# Patient Record
Sex: MALE | Race: WHITE | URBAN - METROPOLITAN AREA
[De-identification: names, ages, dates, MRNs, and addresses within clinical notes are randomized per-mention and may not be internally consistent; named-entity substitution may affect disease eponyms.]

---

## 2023-08-03 ENCOUNTER — OFFICE (OUTPATIENT)
Dept: URBAN - METROPOLITAN AREA CLINIC 113 | Facility: CLINIC | Age: 78
Setting detail: OPHTHALMOLOGY
End: 2023-08-03
Payer: MEDICARE

## 2023-08-03 DIAGNOSIS — H26.493: ICD-10-CM

## 2023-08-03 DIAGNOSIS — Z96.1: ICD-10-CM

## 2023-08-03 DIAGNOSIS — H16.223: ICD-10-CM

## 2023-08-03 DIAGNOSIS — H43.393: ICD-10-CM

## 2023-08-03 PROCEDURE — 92014 COMPRE OPH EXAM EST PT 1/>: CPT | Performed by: OPHTHALMOLOGY

## 2023-08-03 PROCEDURE — 92250 FUNDUS PHOTOGRAPHY W/I&R: CPT | Performed by: OPHTHALMOLOGY

## 2023-08-03 ASSESSMENT — REFRACTION_AUTOREFRACTION
OD_AXIS: 085
OD_SPHERE: -0.50
OS_SPHERE: -1.00
OD_CYLINDER: -0.75
OS_AXIS: 133
OS_CYLINDER: -0.75

## 2023-08-03 ASSESSMENT — REFRACTION_MANIFEST
OD_ADD: +2.50
OS_SPHERE: -0.75
OD_CYLINDER: SPH
OS_VA2: J1
OS_CYLINDER: -0.50
OD_AXIS: 101
OD_VA1: 20/20
OS_VA1: 20/20
OS_AXIS: 129
OD_CYLINDER: -1.50
OD_VA2: J1
OS_AXIS: 130
OD_VA1: 20/30
OS_VA1: 20/20
OS_CYLINDER: -1.25
OD_SPHERE: +1.00
OD_SPHERE: -0.75
OS_SPHERE: -0.50
OU_VA: 20/20
OS_ADD: +2.50

## 2023-08-03 ASSESSMENT — SPHEQUIV_DERIVED
OS_SPHEQUIV: -0.75
OD_SPHEQUIV: -0.875
OD_SPHEQUIV: 0.25
OS_SPHEQUIV: -1.375
OS_SPHEQUIV: -1.375

## 2023-08-03 ASSESSMENT — REFRACTION_CURRENTRX
OD_OVR_VA: 20/
OD_SPHERE: PLANO
OS_CYLINDER: -0.75
OD_VPRISM_DIRECTION: PROGS
OS_OVR_VA: 20/
OS_VPRISM_DIRECTION: SV
OD_SPHERE: -0.75
OD_AXIS: 089
OS_CYLINDER: -1.25
OS_SPHERE: -0.25
OS_VPRISM_DIRECTION: PROGS
OD_OVR_VA: 20/
OS_ADD: +0.75
OS_OVR_VA: 20/
OS_SPHERE: +0.50
OD_CYLINDER: -1.00
OD_VPRISM_DIRECTION: SV
OD_CYLINDER: SPHERE
OS_AXIS: 124
OS_AXIS: 098
OD_ADD: +0.75

## 2023-08-03 ASSESSMENT — AXIALLENGTH_DERIVED
OS_AL: 23.6086
OD_AL: 23.7248
OS_AL: 23.855
OD_AL: 24.1765
OS_AL: 23.855

## 2023-08-03 ASSESSMENT — SUPERFICIAL PUNCTATE KERATITIS (SPK)
OD_SPK: 1+
OS_SPK: 1+

## 2023-08-03 ASSESSMENT — CONFRONTATIONAL VISUAL FIELD TEST (CVF)
OD_FINDINGS: FULL
OS_FINDINGS: FULL

## 2023-08-03 ASSESSMENT — KERATOMETRY
OD_AXISANGLE_DEGREES: 155
OD_K1POWER_DIOPTERS: 42.75
OS_AXISANGLE_DEGREES: 062
OS_K2POWER_DIOPTERS: 44.50
OS_K1POWER_DIOPTERS: 44.00
OD_K2POWER_DIOPTERS: 43.00

## 2023-08-03 ASSESSMENT — VISUAL ACUITY
OS_BCVA: 20/20+1
OD_BCVA: 20/20

## 2023-08-03 ASSESSMENT — TONOMETRY
OD_IOP_MMHG: 16
OS_IOP_MMHG: 18

## 2024-02-22 PROBLEM — Z00.00 ENCOUNTER FOR PREVENTIVE HEALTH EXAMINATION: Status: ACTIVE | Noted: 2024-02-22

## 2024-04-09 ENCOUNTER — NON-APPOINTMENT (OUTPATIENT)
Age: 79
End: 2024-04-09

## 2024-04-09 DIAGNOSIS — M77.52 OTHER ENTHESOPATHY OF LT FOOT AND ANKLE: ICD-10-CM

## 2024-04-09 DIAGNOSIS — L85.3 XEROSIS CUTIS: ICD-10-CM

## 2024-04-09 DIAGNOSIS — M20.41 OTHER HAMMER TOE(S) (ACQUIRED), RIGHT FOOT: ICD-10-CM

## 2024-04-09 DIAGNOSIS — B35.1 TINEA UNGUIUM: ICD-10-CM

## 2024-04-09 DIAGNOSIS — R23.4 CHANGES IN SKIN TEXTURE: ICD-10-CM

## 2024-04-09 DIAGNOSIS — G60.9 HEREDITARY AND IDIOPATHIC NEUROPATHY, UNSPECIFIED: ICD-10-CM

## 2024-04-09 RX ORDER — ATORVASTATIN CALCIUM 20 MG/1
20 TABLET, FILM COATED ORAL
Refills: 0 | Status: ACTIVE | COMMUNITY

## 2024-04-09 RX ORDER — MELOXICAM 15 MG/1
15 TABLET ORAL
Refills: 0 | Status: ACTIVE | COMMUNITY
Start: 1900-01-01 | End: 1900-01-01

## 2024-04-19 ENCOUNTER — APPOINTMENT (OUTPATIENT)
Dept: PODIATRY | Facility: CLINIC | Age: 79
End: 2024-04-19
Payer: MEDICARE

## 2024-04-19 VITALS — WEIGHT: 195 LBS | HEIGHT: 75 IN | BODY MASS INDEX: 24.25 KG/M2

## 2024-04-19 DIAGNOSIS — M77.32 CALCANEAL SPUR, LEFT FOOT: ICD-10-CM

## 2024-04-19 DIAGNOSIS — M72.2 PLANTAR FASCIAL FIBROMATOSIS: ICD-10-CM

## 2024-04-19 PROCEDURE — 99214 OFFICE O/P EST MOD 30 MIN: CPT

## 2024-04-22 PROBLEM — M72.2 PLANTAR FASCIITIS, LEFT: Status: ACTIVE | Noted: 2024-04-09

## 2024-04-22 PROBLEM — M77.32 CALCANEAL SPUR OF LEFT FOOT: Status: ACTIVE | Noted: 2024-04-09

## 2024-04-22 NOTE — HISTORY OF PRESENT ILLNESS
[FreeTextEntry1] : Truong Buckner returns stating he is doing a lot better.  He states that his left heel feels a lot better.  He states that he has been taking the medicine and doing the stretching.  He also stated that it felt better wrapped up.  He states that he is very grateful because the pain is almost gone.

## 2024-06-13 ENCOUNTER — APPOINTMENT (OUTPATIENT)
Dept: PODIATRY | Facility: CLINIC | Age: 79
End: 2024-06-13
Payer: MEDICARE

## 2024-06-13 DIAGNOSIS — M79.675 PAIN IN LEFT TOE(S): ICD-10-CM

## 2024-06-13 DIAGNOSIS — B35.1 TINEA UNGUIUM: ICD-10-CM

## 2024-06-13 DIAGNOSIS — M79.674 PAIN IN RIGHT TOE(S): ICD-10-CM

## 2024-06-13 PROCEDURE — 11721 DEBRIDE NAIL 6 OR MORE: CPT

## 2024-06-17 PROBLEM — M79.675 PAIN OF TOE OF LEFT FOOT: Status: ACTIVE | Noted: 2024-04-09

## 2024-06-17 PROBLEM — M79.674 PAIN OF TOE OF RIGHT FOOT: Status: ACTIVE | Noted: 2024-04-09

## 2024-06-17 PROBLEM — B35.1 ONYCHOMYCOSIS DUE TO TRICHOPHYTON RUBRUM: Status: ACTIVE | Noted: 2024-04-09

## 2024-06-17 NOTE — ASSESSMENT
[FreeTextEntry1] : Assessment: Onychomycosis caused by T. Rubrum.  Plan: I then performed aseptic debridement of all the toenails both mechanically and via electrical burring.  All toenails were trimmed with a 14 cm sterile stainless steel box lock double spring nail splitter.  Then utilizing a sterile pear shaped kwaku sammi (this device falls under bur, surgical, general & plastic surgery.  The FDA deems this item a Class-1 device) via a 35,000 RPM electric drill and vacuum and dust extractor system all toenails were aseptically debrided removing fungal layers.  This is done to diminish the fungal load of the toenails and enhance the effects of the antifungal medication, allowing overall improvement in the degree of fungal infection as well as improve appearance and reduce discomfort and help diminish chances of secondary bacterial infection, also lessening the chance of ingrown nails, especially when performed on a regular basis.  The patient was encouraged to continue with the topical antifungal medication everyday and use the Clean Sweep antifungal spray to disinfect their shoes.  PTR: 2 months.

## 2024-08-08 ENCOUNTER — OFFICE (OUTPATIENT)
Dept: URBAN - METROPOLITAN AREA CLINIC 113 | Facility: CLINIC | Age: 79
Setting detail: OPHTHALMOLOGY
End: 2024-08-08
Payer: MEDICARE

## 2024-08-08 DIAGNOSIS — H26.493: ICD-10-CM

## 2024-08-08 DIAGNOSIS — Z96.1: ICD-10-CM

## 2024-08-08 DIAGNOSIS — H16.223: ICD-10-CM

## 2024-08-08 DIAGNOSIS — H43.393: ICD-10-CM

## 2024-08-08 PROCEDURE — 92250 FUNDUS PHOTOGRAPHY W/I&R: CPT | Performed by: OPHTHALMOLOGY

## 2024-08-08 PROCEDURE — 92014 COMPRE OPH EXAM EST PT 1/>: CPT | Performed by: OPHTHALMOLOGY

## 2024-08-08 ASSESSMENT — CONFRONTATIONAL VISUAL FIELD TEST (CVF)
OD_FINDINGS: FULL
OS_FINDINGS: FULL

## 2024-08-14 ENCOUNTER — OFFICE (OUTPATIENT)
Dept: URBAN - METROPOLITAN AREA CLINIC 113 | Facility: CLINIC | Age: 79
Setting detail: OPHTHALMOLOGY
End: 2024-08-14
Payer: COMMERCIAL

## 2024-08-14 DIAGNOSIS — Z01.00: ICD-10-CM

## 2024-08-14 PROBLEM — H26.491 POSTERIOR CAPSULAR OPACIFICATION; RIGHT EYE: Status: ACTIVE | Noted: 2024-08-14

## 2024-08-14 PROCEDURE — 92014 COMPRE OPH EXAM EST PT 1/>: CPT | Performed by: OPTOMETRIST

## 2024-08-14 ASSESSMENT — CONFRONTATIONAL VISUAL FIELD TEST (CVF)
OD_FINDINGS: FULL
OS_FINDINGS: FULL

## 2024-08-23 ENCOUNTER — APPOINTMENT (OUTPATIENT)
Dept: PODIATRY | Facility: CLINIC | Age: 79
End: 2024-08-23

## 2024-08-23 DIAGNOSIS — M79.675 PAIN IN LEFT TOE(S): ICD-10-CM

## 2024-08-23 DIAGNOSIS — M79.674 PAIN IN RIGHT TOE(S): ICD-10-CM

## 2024-08-23 DIAGNOSIS — B35.1 TINEA UNGUIUM: ICD-10-CM

## 2024-08-23 PROCEDURE — 11721 DEBRIDE NAIL 6 OR MORE: CPT

## 2024-08-26 NOTE — ASSESSMENT
[FreeTextEntry1] : Assessment: Onychomycosis caused by T. Rubrum.   Plan: Debridement of each toenail on each foot was performed both mechanically and via electrical grinding.  All toenails were trimmed with a 14 cm sterile stainless steel box lock double spring nail splitter.  Then utilizing a sterile pear shaped kwaku sammi (this device falls under bur, surgical, general & plastic surgery.  The FDA deems this item a Class-1 device) via a 35,000 RPM electric drill and vacuum and dust extractor system all toenails were aseptically debrided removing fungal layers.  This is done to diminish the fungal load of the toenails and enhance the effects of the antifungal medication, allowing overall improvement in the degree of fungal infection as well as improve appearance and reduce discomfort and help diminish chances of secondary bacterial infection, also lessening the chance of ingrown nails, especially when performed on a regular basis.  A topical antifungal was used and he was encouraged to continue using the antifungal prescribed everyday and use the Clean Sweep antifungal spray to disinfect their shoes.  PTR: 2 months.

## 2024-08-26 NOTE — HISTORY OF PRESENT ILLNESS
[FreeTextEntry1] : The patient returns stating that the previous care of his nails gave him great relief.  He said that the nails after a while became uncomfortable and was irritating adjacent toes on toes 1, 2, 3, 4 and 5 right foot and 1, 2, 3, 4 and 5, left foot.  He said it was hard to walk when his toenails hurt.

## 2024-10-25 ENCOUNTER — APPOINTMENT (OUTPATIENT)
Dept: PODIATRY | Facility: CLINIC | Age: 79
End: 2024-10-25
Payer: MEDICARE

## 2024-10-25 DIAGNOSIS — B35.1 TINEA UNGUIUM: ICD-10-CM

## 2024-10-25 DIAGNOSIS — M79.674 PAIN IN RIGHT TOE(S): ICD-10-CM

## 2024-10-25 DIAGNOSIS — M79.675 PAIN IN LEFT TOE(S): ICD-10-CM

## 2024-10-25 PROCEDURE — 11721 DEBRIDE NAIL 6 OR MORE: CPT

## 2025-01-03 ENCOUNTER — APPOINTMENT (OUTPATIENT)
Dept: PODIATRY | Facility: CLINIC | Age: 80
End: 2025-01-03
Payer: MEDICARE

## 2025-01-03 DIAGNOSIS — M79.674 PAIN IN RIGHT TOE(S): ICD-10-CM

## 2025-01-03 DIAGNOSIS — B35.1 TINEA UNGUIUM: ICD-10-CM

## 2025-01-03 DIAGNOSIS — M79.675 PAIN IN LEFT TOE(S): ICD-10-CM

## 2025-01-03 PROCEDURE — 11721 DEBRIDE NAIL 6 OR MORE: CPT

## 2025-03-07 ENCOUNTER — APPOINTMENT (OUTPATIENT)
Dept: PODIATRY | Facility: CLINIC | Age: 80
End: 2025-03-07
Payer: MEDICARE

## 2025-03-07 DIAGNOSIS — M79.675 PAIN IN LEFT TOE(S): ICD-10-CM

## 2025-03-07 DIAGNOSIS — M79.674 PAIN IN RIGHT TOE(S): ICD-10-CM

## 2025-03-07 DIAGNOSIS — B35.1 TINEA UNGUIUM: ICD-10-CM

## 2025-03-07 PROCEDURE — 11721 DEBRIDE NAIL 6 OR MORE: CPT

## 2025-05-08 ENCOUNTER — NON-APPOINTMENT (OUTPATIENT)
Age: 80
End: 2025-05-08

## 2025-05-09 ENCOUNTER — APPOINTMENT (OUTPATIENT)
Dept: PODIATRY | Facility: CLINIC | Age: 80
End: 2025-05-09
Payer: MEDICARE

## 2025-05-09 DIAGNOSIS — L85.3 XEROSIS CUTIS: ICD-10-CM

## 2025-05-09 DIAGNOSIS — B35.1 TINEA UNGUIUM: ICD-10-CM

## 2025-05-09 DIAGNOSIS — M79.675 PAIN IN LEFT TOE(S): ICD-10-CM

## 2025-05-09 DIAGNOSIS — R23.4 CHANGES IN SKIN TEXTURE: ICD-10-CM

## 2025-05-09 DIAGNOSIS — M79.674 PAIN IN RIGHT TOE(S): ICD-10-CM

## 2025-05-09 PROCEDURE — 11721 DEBRIDE NAIL 6 OR MORE: CPT

## 2025-05-09 PROCEDURE — 99213 OFFICE O/P EST LOW 20 MIN: CPT | Mod: 25

## 2025-07-18 ENCOUNTER — APPOINTMENT (OUTPATIENT)
Dept: PODIATRY | Facility: CLINIC | Age: 80
End: 2025-07-18
Payer: MEDICARE

## 2025-07-18 PROCEDURE — 11721 DEBRIDE NAIL 6 OR MORE: CPT

## 2025-08-07 ENCOUNTER — OFFICE (OUTPATIENT)
Dept: URBAN - METROPOLITAN AREA CLINIC 113 | Facility: CLINIC | Age: 80
Setting detail: OPHTHALMOLOGY
End: 2025-08-07
Payer: MEDICARE

## 2025-08-07 DIAGNOSIS — Z96.1: ICD-10-CM

## 2025-08-07 DIAGNOSIS — H43.393: ICD-10-CM

## 2025-08-07 PROCEDURE — 92250 FUNDUS PHOTOGRAPHY W/I&R: CPT | Performed by: OPHTHALMOLOGY

## 2025-08-07 PROCEDURE — 92014 COMPRE OPH EXAM EST PT 1/>: CPT | Performed by: OPHTHALMOLOGY

## 2025-08-07 ASSESSMENT — REFRACTION_CURRENTRX
OS_OVR_VA: 20/
OS_SPHERE: -0.50
OD_OVR_VA: 20/
OD_OVR_VA: 20/
OD_AXIS: 180
OD_CYLINDER: 0.00
OS_AXIS: 130
OS_OVR_VA: 20/
OD_SPHERE: +1.75
OD_SPHERE: -0.75
OS_CYLINDER: -0.50
OD_VPRISM_DIRECTION: SV
OD_SPHERE: -0.75
OS_AXIS: 131
OD_VPRISM_DIRECTION: PROGS
OS_CYLINDER: -0.50
OS_OVR_VA: 20/
OD_OVR_VA: 20/
OD_ADD: +0.75
OS_SPHERE: -0.25
OS_VPRISM_DIRECTION: SV
OD_CYLINDER: SPHERE
OS_VPRISM_DIRECTION: PROGS
OD_VPRISM_DIRECTION: SV
OS_AXIS: 124
OS_ADD: +0.75
OS_SPHERE: +2.00
OS_VPRISM_DIRECTION: SV
OS_CYLINDER: -0.75

## 2025-08-07 ASSESSMENT — REFRACTION_MANIFEST
OD_AXIS: 091
OD_VA1: 20/20
OS_VA2: J1
OD_VA1: 20/20
OS_CYLINDER: -0.50
OS_CYLINDER: -1.00
OD_ADD: +2.25
OS_AXIS: 115
OS_AXIS: 130
OS_ADD: +2.25
OS_SPHERE: -0.50
OD_AXIS: 080
OU_VA: 20/20
OD_ADD: +2.50
OD_SPHERE: -0.50
OD_VA2: J1
OD_CYLINDER: SPH
OD_CYLINDER: -0.50
OS_VA1: 20/20
OS_ADD: +2.50
OS_SPHERE: -0.50
OS_VA1: 20/20
OD_SPHERE: -0.75

## 2025-08-07 ASSESSMENT — REFRACTION_AUTOREFRACTION
OD_CYLINDER: -1.00
OD_AXIS: 088
OD_SPHERE: -0.50
OS_AXIS: 131
OS_SPHERE: -1.25
OS_CYLINDER: -0.75

## 2025-08-07 ASSESSMENT — KERATOMETRY
OS_AXISANGLE_DEGREES: 060
OS_K1POWER_DIOPTERS: 44.00
OD_K2POWER_DIOPTERS: 43.50
OS_K2POWER_DIOPTERS: 44.75
OD_K1POWER_DIOPTERS: 42.75
OD_AXISANGLE_DEGREES: 173

## 2025-08-07 ASSESSMENT — VISUAL ACUITY
OS_BCVA: 20/20
OD_BCVA: 20/25

## 2025-08-07 ASSESSMENT — SUPERFICIAL PUNCTATE KERATITIS (SPK)
OD_SPK: 1+
OS_SPK: 1+

## 2025-08-07 ASSESSMENT — TONOMETRY
OS_IOP_MMHG: 17
OD_IOP_MMHG: 12

## 2025-08-07 ASSESSMENT — CONFRONTATIONAL VISUAL FIELD TEST (CVF)
OD_FINDINGS: FULL
OS_FINDINGS: FULL

## 2025-08-13 ENCOUNTER — OFFICE (OUTPATIENT)
Dept: URBAN - METROPOLITAN AREA CLINIC 113 | Facility: CLINIC | Age: 80
Setting detail: OPHTHALMOLOGY
End: 2025-08-13
Payer: MEDICARE

## 2025-08-13 DIAGNOSIS — H17.9: ICD-10-CM

## 2025-08-13 DIAGNOSIS — H52.7: ICD-10-CM

## 2025-08-13 DIAGNOSIS — H26.491: ICD-10-CM

## 2025-08-13 PROCEDURE — 92015 DETERMINE REFRACTIVE STATE: CPT | Performed by: OPTOMETRIST

## 2025-08-13 PROCEDURE — 99213 OFFICE O/P EST LOW 20 MIN: CPT | Performed by: OPTOMETRIST

## 2025-08-13 ASSESSMENT — REFRACTION_CURRENTRX
OS_AXIS: 124
OS_CYLINDER: -0.50
OS_SPHERE: +2.00
OS_VPRISM_DIRECTION: SV
OD_CYLINDER: SPH
OD_SPHERE: -0.75
OD_VPRISM_DIRECTION: SV
OS_CYLINDER: -0.50
OD_SPHERE: +1.75
OD_SPHERE: +2.00
OS_AXIS: 131
OS_AXIS: 130
OD_OVR_VA: 20/
OD_CYLINDER: 0.00
OS_VPRISM_DIRECTION: PROGS
OS_AXIS: 124
OS_SPHERE: +2.00
OS_CYLINDER: -0.75
OS_OVR_VA: 20/
OS_VPRISM_DIRECTION: SV
OS_VPRISM_DIRECTION: SV
OD_SPHERE: -0.75
OS_SPHERE: -0.50
OS_ADD: +0.75
OD_VPRISM_DIRECTION: SV
OS_OVR_VA: 20/
OS_AXIS: 131
OS_CYLINDER: -0.50
OS_VPRISM_DIRECTION: SV
OD_ADD: +0.75
OS_CYLINDER: -0.50
OD_VPRISM_DIRECTION: SV
OD_VPRISM_DIRECTION: PROGS
OS_SPHERE: -0.25
OD_VPRISM_DIRECTION: SV
OS_SPHERE: -0.50
OD_OVR_VA: 20/
OD_SPHERE: -0.75
OD_CYLINDER: -0.25
OD_CYLINDER: SPHERE
OD_OVR_VA: 20/
OD_AXIS: 180
OD_AXIS: 129
OS_OVR_VA: 20/

## 2025-08-13 ASSESSMENT — KERATOMETRY
OS_K2POWER_DIOPTERS: 44.75
OD_AXISANGLE_DEGREES: 155
OS_AXISANGLE_DEGREES: 062
OD_K2POWER_DIOPTERS: 43.25
OS_K1POWER_DIOPTERS: 44.00
OD_K1POWER_DIOPTERS: 42.75

## 2025-08-13 ASSESSMENT — REFRACTION_AUTOREFRACTION
OD_SPHERE: -0.50
OS_AXIS: 126
OS_CYLINDER: -1.00
OD_CYLINDER: -1.00
OS_SPHERE: -1.25
OD_AXIS: 085

## 2025-08-13 ASSESSMENT — REFRACTION_MANIFEST
OS_CYLINDER: -1.00
OS_SPHERE: -1.00
OD_SPHERE: +2.25
OD_CYLINDER: -0.75
OS_AXIS: 115
OS_VA2: J1
OD_CYLINDER: -0.75
OD_VA1: 20/20
OD_VA2: J1
OS_CYLINDER: -1.00
OS_AXIS: 115
OD_AXIS: 085
OD_AXIS: 085
OS_SPHERE: +1.50
OD_SPHERE: -0.25
OS_VA1: 20/20

## 2025-08-13 ASSESSMENT — VISUAL ACUITY
OD_BCVA: 20/20
OS_BCVA: 20/20-1

## 2025-08-13 ASSESSMENT — SUPERFICIAL PUNCTATE KERATITIS (SPK)
OD_SPK: 1+
OS_SPK: 1+

## 2025-08-13 ASSESSMENT — TONOMETRY
OD_IOP_MMHG: 12
OS_IOP_MMHG: 14

## 2025-08-13 ASSESSMENT — CONFRONTATIONAL VISUAL FIELD TEST (CVF)
OS_FINDINGS: FULL
OD_FINDINGS: FULL

## 2025-09-18 ENCOUNTER — APPOINTMENT (OUTPATIENT)
Dept: PODIATRY | Facility: CLINIC | Age: 80
End: 2025-09-18
Payer: MEDICARE

## 2025-09-18 DIAGNOSIS — B35.1 TINEA UNGUIUM: ICD-10-CM

## 2025-09-18 DIAGNOSIS — M79.675 PAIN IN LEFT TOE(S): ICD-10-CM

## 2025-09-18 DIAGNOSIS — M79.674 PAIN IN RIGHT TOE(S): ICD-10-CM

## 2025-09-18 DIAGNOSIS — L85.3 XEROSIS CUTIS: ICD-10-CM

## 2025-09-18 PROCEDURE — 11721 DEBRIDE NAIL 6 OR MORE: CPT

## 2025-09-18 PROCEDURE — 99213 OFFICE O/P EST LOW 20 MIN: CPT | Mod: 25
